# Patient Record
Sex: MALE | Race: WHITE
[De-identification: names, ages, dates, MRNs, and addresses within clinical notes are randomized per-mention and may not be internally consistent; named-entity substitution may affect disease eponyms.]

---

## 2022-10-04 ENCOUNTER — HOSPITAL ENCOUNTER (EMERGENCY)
Dept: HOSPITAL 26 - MED | Age: 32
Discharge: HOME | End: 2022-10-04
Payer: MEDICAID

## 2022-10-04 VITALS — SYSTOLIC BLOOD PRESSURE: 128 MMHG | DIASTOLIC BLOOD PRESSURE: 68 MMHG

## 2022-10-04 VITALS — SYSTOLIC BLOOD PRESSURE: 125 MMHG | DIASTOLIC BLOOD PRESSURE: 78 MMHG

## 2022-10-04 VITALS — WEIGHT: 209 LBS | HEIGHT: 66 IN | BODY MASS INDEX: 33.59 KG/M2
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
## 2022-10-04 DIAGNOSIS — S01.01XA: Primary | ICD-10-CM

## 2022-10-04 DIAGNOSIS — F10.129: ICD-10-CM
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
## 2022-10-04 DIAGNOSIS — F15.90: ICD-10-CM
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
## 2022-10-04 DIAGNOSIS — F17.200: ICD-10-CM

## 2022-10-04 NOTE — NUR
pt placed in chair d via stand and pivot due to unsteady gait. posterior head 
wound irrigated with normal saline.